# Patient Record
(demographics unavailable — no encounter records)

---

## 2025-05-09 NOTE — HISTORY OF PRESENT ILLNESS
[FreeTextEntry1] : 65 yo with fibroid uterus and urinary frequency.  She was initially seen for consultation in 5/2023, and last follow up was in 6/2023.  6/2023 MRI pelvis revealed moderately enlarged uterus due to 2 large fibroids, no change since prior CT in 11/2020. Uterus measured 12 x 9.8 x 8.8cm with posterior mid fundal fibroid 6.8 x 6.5 x 6.2cm, and left anterior mid fundal 6.4 x 5.3 x 5cm, no ovarian masses, no adenopathy.  At last visit in 6/2023, options of hysterectomy vs continued surveillance was discussed.  She has since continued follow up with her PCP Dr. Antony.  Sonogram in 2/2024 revealed fibroids measuring up to 6.1cm, ovaries not seen.  Most recent sonogram in 3/2025 with fibroid uterus 12.5 x 8.3 x 7.5cm with multiple fibroids measuring up to 7.4cm, EMS 5mm, ovaries unremarkable.  She was recommended to f/u with gyn onc for further management.  She has not seen a gyn since last visit with me.  She reports stable urinary frequency as  before.  No pelvic pain, nausea/vomiting, abdominal bloating, changes in bowel habits.

## 2025-05-09 NOTE — DISCUSSION/SUMMARY
[FreeTextEntry1] : 67 yo with uterine fibroids  I discussed my recommendations with Ms. Peng in Mandarin chinese Definitive diagnosis of the fibroid warrants a hysterectomy.  Given increase in size in menopausal state, she should consider hysterectomy.  Alternative to hysterectomy is continued observation which carries risk of disease progression.  RIsks/benefits of each option discussed.  She desires observation at this time.

## 2025-05-09 NOTE — PHYSICAL EXAM
[Chaperoned Physical Exam] : A chaperone was present in the examining room during all aspects of the physical examination. [MA] : MA [FreeTextEntry2] : Edu [Normal] : Anus and perineum: Normal sphincter tone, no masses, no prolapse.

## 2025-05-09 NOTE — LETTER BODY
[Dear  ___] : Dear  [unfilled], [I recently saw our patient [unfilled] for a follow-up visit.] : I recently saw our patient, [unfilled] for a follow-up visit. [Attached please find my note.] : Attached please find my note. I will STOP taking the medications listed below when I get home from the hospital:  None

## 2025-05-09 NOTE — DISCUSSION/SUMMARY
[FreeTextEntry1] : 65 yo with uterine fibroids  I discussed my recommendations with Ms. Peng in Mandarin chinese Definitive diagnosis of the fibroid warrants a hysterectomy.  Given increase in size in menopausal state, she should consider hysterectomy.  Alternative to hysterectomy is continued observation which carries risk of disease progression.  RIsks/benefits of each option discussed.  She desires observation at this time.

## 2025-07-01 NOTE — DISCUSSION/SUMMARY
[FreeTextEntry1] : Pelvic sono findings d/w pt: Fibroids x 2 unchanged compared to previous MRI. Em thickened and heterogeneous. D&C/hysteroscopy advised. PT has apt to see Dr Mary is early July.

## 2025-07-01 NOTE — PHYSICAL EXAM
[MA] : MA [Appropriately responsive] : appropriately responsive [Alert] : alert [No Acute Distress] : no acute distress [No Lymphadenopathy] : no lymphadenopathy [Regular Rate Rhythm] : regular rate rhythm [No Murmurs] : no murmurs [Clear to Auscultation B/L] : clear to auscultation bilaterally [Soft] : soft [Non-tender] : non-tender [Non-distended] : non-distended [No HSM] : No HSM [No Lesions] : no lesions [No Mass] : no mass [Oriented x3] : oriented x3 [Labia Majora] : normal [Labia Minora] : normal [Scant] : There was scant vaginal bleeding [Normal] : normal [Enlarged ___ wks] : enlarged [unfilled] ~Uweeks [FreeTextEntry2] : Susan

## 2025-07-01 NOTE — HISTORY OF PRESENT ILLNESS
[FreeTextEntry1] : CC: vag bleeding like a light period since 2025 HPI: h/o large fibroids.  LMP: Now   POBGYN:  S[facility] x 1  x 3 H/O Fibroids    PAP: 2025 Mammo and Pelvic Sono:

## 2025-07-07 NOTE — DISCUSSION/SUMMARY
[FreeTextEntry1] : I discussed my recommendations with Ms. Peng in detail in Kenyan and English  Discussed implications of PMB and thickened endometrium.  Pathologic evaluation of the endometrium is warranted.  Discussed option of D&C hysteroscopy vs definitive hysterectomy.  Risks/benefits of each option discussed. Postoperative recovery and expectations of each option discussed.  Discussed with hysterectomy plan for minimally invasive approach with minilaparotomy for specimen extraction.  She will consider options and call office with her decision.

## 2025-07-07 NOTE — DISCUSSION/SUMMARY
[FreeTextEntry1] : I discussed my recommendations with Ms. Peng in detail in [place]une[place] and English  Discussed implications of PMB and thickened endometrium.  Pathologic evaluation of the endometrium is warranted.  Discussed option of D&C hysteroscopy vs definitive hysterectomy.  Risks/benefits of each option discussed. Postoperative recovery and expectations of each option discussed.  Discussed with hysterectomy plan for minimally invasive approach with minilaparotomy for specimen extraction.  She will consider options and call office with her decision.

## 2025-07-07 NOTE — HISTORY OF PRESENT ILLNESS
[FreeTextEntry1] : 63 yo with fibroid uterus and urinary frequency.  She was initially seen for consultation in 5/2023, and last seen 5/2025 6/2023 MRI pelvis revealed moderately enlarged uterus due to 2 large fibroids, no change since prior CT in 11/2020. Uterus measured 12 x 9.8 x 8.8cm with posterior mid fundal fibroid 6.8 x 6.5 x 6.2cm, and left anterior mid fundal 6.4 x 5.3 x 5cm, no ovarian masses, no adenopathy.  2/2024 Pelvic sono - fibroids measuring up to 6.1cm, ovaries not seen  3/2025  Pelvic sono fibroid uterus 12.5 x 8.3 x 7.5cm with multiple fibroids measuring up to 7.4cm, EMS 5mm, ovaries unremarkable  6/2025 Pelvic sono - uterus 11.48 x 7.98 x 7.95cm, fibroids 6.68 x 6.86 x 6.52 SS, 6.33 x 6.11 x 6.77 SS, EMS 7.2mm right ovary 2.59 x 2.97 x 1.69cm, left ovary 1.91 x 2.25 x 1.21cm  She saw Dr. Hills in 6/2025 with complaint of PMB. Pelvic sonogram revealed thickened endometrium.   Endometrial biopsy was attempted but not feasible due to  cervical stenosis.  No further bleeding.  She continues to have urinary urgency attributed to the fibroids.

## 2025-07-07 NOTE — PHYSICAL EXAM
[Chaperoned Physical Exam] : A chaperone was present in the examining room during all aspects of the physical examination. [MA] : MA [Normal] : Anus and perineum: Normal sphincter tone, no masses, no prolapse. [FreeTextEntry2] : Edu